# Patient Record
Sex: MALE | Race: WHITE | Employment: FULL TIME | URBAN - METROPOLITAN AREA
[De-identification: names, ages, dates, MRNs, and addresses within clinical notes are randomized per-mention and may not be internally consistent; named-entity substitution may affect disease eponyms.]

---

## 2021-03-20 ENCOUNTER — HOSPITAL ENCOUNTER (EMERGENCY)
Age: 56
Discharge: HOME | End: 2021-03-20
Attending: EMERGENCY MEDICINE
Payer: COMMERCIAL

## 2021-03-20 ENCOUNTER — NURSE TRIAGE (OUTPATIENT)
Dept: OTHER | Facility: CLINIC | Age: 56
End: 2021-03-20

## 2021-03-20 DIAGNOSIS — R42 VERTIGO: Primary | ICD-10-CM

## 2021-03-20 PROCEDURE — 93005 ELECTROCARDIOGRAM TRACING: CPT

## 2021-03-20 PROCEDURE — 74011250636 HC RX REV CODE- 250/636: Performed by: EMERGENCY MEDICINE

## 2021-03-20 PROCEDURE — 96374 THER/PROPH/DIAG INJ IV PUSH: CPT

## 2021-03-20 PROCEDURE — 99284 EMERGENCY DEPT VISIT MOD MDM: CPT

## 2021-03-20 RX ORDER — MECLIZINE HYDROCHLORIDE 25 MG/1
25 TABLET ORAL
Qty: 20 TAB | Refills: 0 | Status: SHIPPED | OUTPATIENT
Start: 2021-03-20 | End: 2021-05-03

## 2021-03-20 RX ORDER — ONDANSETRON 4 MG/1
4 TABLET, ORALLY DISINTEGRATING ORAL
Qty: 16 TAB | Refills: 0 | Status: SHIPPED | OUTPATIENT
Start: 2021-03-20

## 2021-03-20 RX ORDER — ONDANSETRON 4 MG/1
4 TABLET, ORALLY DISINTEGRATING ORAL
Qty: 16 TAB | Refills: 0 | Status: SHIPPED | OUTPATIENT
Start: 2021-03-20 | End: 2021-03-20 | Stop reason: SDUPTHER

## 2021-03-20 RX ORDER — ONDANSETRON 2 MG/ML
4 INJECTION INTRAMUSCULAR; INTRAVENOUS
Status: COMPLETED | OUTPATIENT
Start: 2021-03-20 | End: 2021-03-20

## 2021-03-20 RX ORDER — MECLIZINE HCL 12.5 MG 12.5 MG/1
25 TABLET ORAL
Status: COMPLETED | OUTPATIENT
Start: 2021-03-20 | End: 2021-03-20

## 2021-03-20 RX ORDER — MECLIZINE HYDROCHLORIDE 25 MG/1
25 TABLET ORAL
Qty: 20 TAB | Refills: 0 | Status: SHIPPED | OUTPATIENT
Start: 2021-03-20 | End: 2021-03-20 | Stop reason: SDUPTHER

## 2021-03-20 RX ADMIN — MECLIZINE 25 MG: 12.5 TABLET ORAL at 19:38

## 2021-03-20 RX ADMIN — ONDANSETRON 4 MG: 2 INJECTION INTRAMUSCULAR; INTRAVENOUS at 18:34

## 2021-03-20 RX ADMIN — MECLIZINE 25 MG: 12.5 TABLET ORAL at 18:34

## 2021-03-20 RX ADMIN — SODIUM CHLORIDE 1000 ML: 9 INJECTION, SOLUTION INTRAVENOUS at 18:35

## 2021-03-20 NOTE — ED TRIAGE NOTES
Patient presents to the emergency department reporting dizziness. Patient's wife reports patient started having a headache and dizziness on Thursday night. Patient felt fine Friday. Patient reports headache and dizziness with vomiting today. Patient vomited en route to room for triage. Patient reports dizziness is not present when he closes his eyes.

## 2021-03-20 NOTE — ED PROVIDER NOTES
Date: 3/20/2021  Patient Name: Justin Raines    History of Presenting Illness     Chief Complaint   Patient presents with    Dizziness       History Provided By: Patient    HPI: Justin Raines, 64 y.o. male with a past medical history of hyperlipidemia and depression presents to the ED with cc of dizziness that began 2 days ago. Patient states that he initially started having a sensation of the room spinning around 5 PM 2 days ago. He laid down and went to sleep and felt better yesterday but then today it started again. He denies any prior history of any similar symptoms. He has been having nausea and vomiting associated with it. He denies any headache or vision changes, but he states that anytime he opens his eyes or turns his head that is when things start spinning again. He denies any changes in speech, gait changes, facial droop, numbness or tingling or weakness in his extremities. He denies any recent illnesses. There are no other complaints, changes, or physical findings at this time. PCP: Helga Anthony MD    No current facility-administered medications on file prior to encounter. Current Outpatient Medications on File Prior to Encounter   Medication Sig Dispense Refill    atorvastatin (LIPITOR) 20 mg tablet Take 20 mg by mouth daily.  escitalopram (LEXAPRO) 10 mg tablet Take 10 mg by mouth daily.  diazepam (VALIUM) 5 mg tablet Take 1 Tab by mouth every eight (8) hours as needed (spasms) for 20 doses. 20 Tab 0    HYDROcodone-acetaminophen (NORCO) 5-325 mg per tablet Take 1 Tab by mouth every four (4) hours as needed for Pain. 20 Tab 0    ibuprofen (MOTRIN) 600 mg tablet Take 1 Tab by mouth every six (6) hours as needed for Pain.  20 Tab 0       Past History     Past Medical History:  Past Medical History:   Diagnosis Date    High cholesterol     Psychiatric disorder     depression       Past Surgical History:  Past Surgical History:   Procedure Laterality Date    HX ORTHOPAEDIC      right shoulder    NE ABDOMEN SURGERY PROC UNLISTED      hernia       Family History:  No family history on file. Social History:  Social History     Tobacco Use    Smoking status: Former Smoker    Smokeless tobacco: Never Used   Substance Use Topics    Alcohol use: No    Drug use: No       Allergies:  No Known Allergies      Review of Systems   Review of Systems   Constitutional: Negative for chills and fever. HENT: Negative. Eyes: Negative for visual disturbance. Respiratory: Negative for shortness of breath and wheezing. Cardiovascular: Negative for chest pain. Gastrointestinal: Positive for nausea and vomiting. Negative for abdominal pain, constipation and diarrhea. Endocrine: Negative. Genitourinary: Negative. Musculoskeletal: Negative. Skin: Negative. Neurological: Positive for dizziness. Negative for facial asymmetry, speech difficulty, weakness, light-headedness, numbness and headaches. Psychiatric/Behavioral: Negative. Physical Exam   Physical Exam  Vitals signs and nursing note reviewed. Constitutional:       General: He is not in acute distress. Appearance: He is well-developed. HENT:      Head: Normocephalic and atraumatic. Eyes:      Extraocular Movements: Extraocular movements intact. Conjunctiva/sclera: Conjunctivae normal.      Pupils: Pupils are equal, round, and reactive to light. Comments: R unilateral nystagmus on lateral gaze   Neck:      Musculoskeletal: Neck supple. Vascular: No JVD. Trachea: No tracheal deviation. Cardiovascular:      Rate and Rhythm: Normal rate and regular rhythm. Heart sounds: No murmur. No friction rub. No gallop. Pulmonary:      Effort: Pulmonary effort is normal. No respiratory distress. Breath sounds: Normal breath sounds. No stridor. No wheezing. Abdominal:      General: Bowel sounds are normal. There is no distension. Palpations: Abdomen is soft.  There is no mass. Tenderness: There is no abdominal tenderness. There is no guarding. Musculoskeletal: Normal range of motion. General: No tenderness. Comments: No deformity   Skin:     General: Skin is warm and dry. Findings: No rash. Neurological:      General: No focal deficit present. Mental Status: He is alert and oriented to person, place, and time. Cranial Nerves: No cranial nerve deficit. Sensory: No sensory deficit. Motor: No weakness. Coordination: Coordination normal.      Comments: No focal deficits   Psychiatric:         Behavior: Behavior normal.         Thought Content: Thought content normal.         Judgment: Judgment normal.         Diagnostic Study Results     Labs -  Recent Results (from the past 72 hour(s))   EKG, 12 LEAD, INITIAL    Collection Time: 03/20/21  6:11 PM   Result Value Ref Range    Ventricular Rate 96 BPM    Atrial Rate 96 BPM    P-R Interval 150 ms    QRS Duration 90 ms    Q-T Interval 358 ms    QTC Calculation (Bezet) 452 ms    Calculated P Axis 40 degrees    Calculated R Axis 2 degrees    Calculated T Axis 17 degrees    Diagnosis       Normal sinus rhythm  Normal ECG  No previous ECGs available         Radiologic Studies -   No orders to display     CT Results  (Last 48 hours)    None        CXR Results  (Last 48 hours)    None          Medical Decision Making   I am the first provider for this patient. I reviewed the vital signs, available nursing notes, past medical history, past surgical history, family history and social history. Vital Signs-Reviewed the patient's vital signs. Patient Vitals for the past 12 hrs:   Temp Pulse Resp BP SpO2   03/20/21 1806 98.4 °F (36.9 °C) 100 15 (!) 149/91 97 %         Records Reviewed: Nursing Notes and Old Medical Records    Provider Notes (Medical Decision Making):   Pt presenting with positional vertigo, n/v. Pt has a normal neuro exam. Will treat symptomatically then reassess.      ED Course:   Initial assessment performed. The patients presenting problems have been discussed, and they are in agreement with the care plan formulated and outlined with them. I have encouraged them to ask questions as they arise throughout their visit. EKG interpreted by me. Shows a normal sinus rhythm with a heart rate of 96. No ST elevations or depressions concerning for ischemia. Normal intervals. 7:19 PM  Pt states he is feeling much better, but he is not quite ready to stand up and walk yet. Nausea has resolved. 7:19 PM  Change of shift. Care of patient signed over to Dr. Oziel Magana. Bedside handoff complete. Awaiting another dose of meclizine and ambulation. Diagnosis     Clinical Impression:   1. Vertigo        Attestations:    Glenny Funez, DO    Please note that this dictation was completed with LensAR, the computer voice recognition software. Quite often unanticipated grammatical, syntax, homophones, and other interpretive errors are inadvertently transcribed by the computer software. Please disregard these errors. Please excuse any errors that have escaped final proofreading. Thank you.

## 2021-03-20 NOTE — TELEPHONE ENCOUNTER
Reason for Disposition   [1] Dizziness (vertigo) present now AND [2] one or more stroke risk factors (i.e., hypertension, diabetes, prior stroke/TIA, heart attack)  (Exception: prior physician evaluation for this AND no different/worse than usual)    Answer Assessment - Initial Assessment Questions  1. DESCRIPTION: \"Describe your dizziness. \"      Pt states that he feels like he has a hangover, the room is spinning     2. VERTIGO: \"Do you feel like either you or the room is spinning or tilting? \"       Yes     3. LIGHTHEADED: \"Do you feel lightheaded? \" (e.g., somewhat faint, woozy, weak upon standing)      Yes     4. SEVERITY: \"How bad is it? \"  \"Can you walk? \"    - MILD - Feels unsteady but walking normally. - MODERATE - Feels very unsteady when walking, but not falling; interferes with normal activities (e.g., school, work) . - SEVERE - Unable to walk without falling (requires assistance). Moderate     5. ONSET:  \"When did the dizziness begin? \"      2 days ago    6. AGGRAVATING FACTORS: \"Does anything make it worse? \" (e.g., standing, change in head position)      Change in head position    7. CAUSE: \"What do you think is causing the dizziness? \"      Vertigo     8. RECURRENT SYMPTOM: \"Have you had dizziness before? \" If so, ask: \"When was the last time? \" \"What happened that time? \"      Denies     9. OTHER SYMPTOMS: \"Do you have any other symptoms? \" (e.g., headache, weakness, numbness, vomiting, earache)      Nausea with 2 episodes of vomiting today    10. PREGNANCY: \"Is there any chance you are pregnant? \" \"When was your last menstrual period? \"        N/a    Protocols used: DIZZINESS - VERTIGO-ADULT-    Brief description of triage:   Pt is calling with c/o dizziness for the last 2 days. Triage indicates for patient to go to the ED. Care advice provided, patient verbalizes understanding; denies any other questions or concerns; instructed to call back for any new or worsening symptoms.     Attention Provider: Thank you for allowing me to participate in the care of your patient. The patient was connected to triage in response to symptoms provided. Please do not respond through this encounter as the response is not directed to a shared pool.

## 2021-03-21 VITALS
HEART RATE: 100 BPM | HEIGHT: 69 IN | BODY MASS INDEX: 29.71 KG/M2 | OXYGEN SATURATION: 96 % | SYSTOLIC BLOOD PRESSURE: 118 MMHG | RESPIRATION RATE: 15 BRPM | TEMPERATURE: 98.4 F | WEIGHT: 200.62 LBS | DIASTOLIC BLOOD PRESSURE: 87 MMHG

## 2021-03-21 LAB
ATRIAL RATE: 96 BPM
CALCULATED P AXIS, ECG09: 40 DEGREES
CALCULATED R AXIS, ECG10: 2 DEGREES
CALCULATED T AXIS, ECG11: 17 DEGREES
DIAGNOSIS, 93000: NORMAL
P-R INTERVAL, ECG05: 150 MS
Q-T INTERVAL, ECG07: 358 MS
QRS DURATION, ECG06: 90 MS
QTC CALCULATION (BEZET), ECG08: 452 MS
VENTRICULAR RATE, ECG03: 96 BPM

## 2021-03-22 ENCOUNTER — PATIENT OUTREACH (OUTPATIENT)
Dept: OTHER | Age: 56
End: 2021-03-22

## 2021-03-22 NOTE — PROGRESS NOTES
[facility] progress note    Patient eligible for [facility] 994 care management  Discussed the care management program.  Patient agrees to care management services at this time. PMH:   Past Medical History:   Diagnosis Date    High cholesterol     Psychiatric disorder     depression       Social History:   Social History     Socioeconomic History    Marital status:      Spouse name: Not on file    Number of children: Not on file    Years of education: Not on file    Highest education level: Not on file   Occupational History    Not on file   Social Needs    Financial resource strain: Not on file    Food insecurity     Worry: Not on file     Inability: Not on file    Transportation needs     Medical: Not on file     Non-medical: Not on file   Tobacco Use    Smoking status: Former Smoker    Smokeless tobacco: Never Used   Substance and Sexual Activity    Alcohol use: No    Drug use: No    Sexual activity: Not on file   Lifestyle    Physical activity     Days per week: Not on file     Minutes per session: Not on file    Stress: Not on file   Relationships    Social connections     Talks on phone: Not on file     Gets together: Not on file     Attends [employer] service: Not on file     Active member of club or organization: Not on file     Attends meetings of clubs or organizations: Not on file     Relationship status: Not on file    Intimate partner violence     Fear of current or ex partner: Not on file     Emotionally abused: Not on file     Physically abused: Not on file     Forced sexual activity: Not on file   Other Topics Concern    Not on file   Social History Narrative    Not on file         Patient's primary care provider relationship reviewed with patient and modified, as applicable. Care management assessment completed:  Patient continues to have symptoms of Vertigo,however they have lessened. The Meclizine ordered has been very helpful to patient.  He is able to be at work today, however looking at his computer screen has been challenging, causing increased symptoms of the Vertigo. Patient is hopeful that the medication will continue to lessen the symptoms with time. Patient very pleased with his hospital service. Medications:  New Medications at Discharge: Meclizine, Zofran  Changed Medications at Discharge: None  Discontinued Medications at Discharge: None  Current Outpatient Medications   Medication Sig    meclizine (ANTIVERT) 25 mg tablet Take 1 Tab by mouth three (3) times daily as needed for Dizziness.  ondansetron (Zofran ODT) 4 mg disintegrating tablet Take 1 Tab by mouth every eight (8) hours as needed for Nausea.  atorvastatin (LIPITOR) 20 mg tablet Take 20 mg by mouth daily.  escitalopram (LEXAPRO) 10 mg tablet Take 10 mg by mouth daily.  diazepam (VALIUM) 5 mg tablet Take 1 Tab by mouth every eight (8) hours as needed (spasms) for 20 doses.  HYDROcodone-acetaminophen (NORCO) 5-325 mg per tablet Take 1 Tab by mouth every four (4) hours as needed for Pain.  ibuprofen (MOTRIN) 600 mg tablet Take 1 Tab by mouth every six (6) hours as needed for Pain. No current facility-administered medications for this visit. There are no discontinued medications. Performed medication reconciliation with patient, and patient verbalizes understanding of administration of home medications. Patient was given approx. 20 Meclazine tablets to last until he sees the ENT. He is concerned that he will not have enough to get to the appointment, as he had to use 4 on the first day prescribed, due to the extreme vertigo symptoms. Encouraged the patient to reach out to his PCP for missing doses, rather than return to the ED.      Preventive Care     Health Maintenance   Topic Date Due    Hepatitis C Screening  Never done    Lipid Screen  Never done    DTaP/Tdap/Td series (1 - Tdap) Never done    Shingrix Vaccine Age 50> (1 of 2) Never done   Brandi Colorectal Cancer Screening Combo  Never done    Flu Vaccine (1) Never done    Pneumococcal 0-64 years  Aged Out           Goals   Goals      Attends follow up appointments on schedule      Will follow up with Dr. Isaac Medeiros, ENT on 3/24.  Knowledge and adherence of prescribed medication (ie. action, side effects, missed dose, etc.). Barriers/Support system:  patient and spouse      Barriers/Challenges to Care: []  Decline in memory    []  Language barrier     []  Emotional                  []  Limited mobility  []  Lack of motivation     [] Vision, hearing or cognitive impairment [x]  Knowledge [] Financial Barriers []  Lack of support  []  Pain [x]  Other     PCP/Specialist follow up:   No future appointments. Reviewed red flags with patient, and patient verbalizes understanding. Patient given an opportunity to ask questions. No other clinical/social/functional needs noted. The patient agrees to contact the PCP office for questions related to their healthcare. The patient expressed thanks, offered no additional questions and ended the call. Plan for next call:  Review symptoms and medication management.

## 2021-04-13 ENCOUNTER — PATIENT OUTREACH (OUTPATIENT)
Dept: OTHER | Age: 56
End: 2021-04-13

## 2021-04-28 ENCOUNTER — PATIENT OUTREACH (OUTPATIENT)
Dept: OTHER | Age: 56
End: 2021-04-28

## 2021-04-28 NOTE — PROGRESS NOTES
Incoming return call from patient. Patient reports that he was feeling much better. He has a follow up with a physician at [facility] on 5/3, (he could not recall the name, as his wife made the phone call and scheduled the appointment). Reports that the symptoms have improved and he is back at work. Plan to follow up with patient in 1-2 weeks, post new physician appointment.

## 2021-05-03 ENCOUNTER — OFFICE VISIT (OUTPATIENT)
Dept: NEUROLOGY | Age: 56
End: 2021-05-03
Payer: COMMERCIAL

## 2021-05-03 VITALS
HEART RATE: 90 BPM | DIASTOLIC BLOOD PRESSURE: 80 MMHG | RESPIRATION RATE: 18 BRPM | SYSTOLIC BLOOD PRESSURE: 120 MMHG | OXYGEN SATURATION: 98 %

## 2021-05-03 DIAGNOSIS — G43.109 MIGRAINE WITH VERTIGO: Primary | ICD-10-CM

## 2021-05-03 PROCEDURE — 99203 OFFICE O/P NEW LOW 30 MIN: CPT | Performed by: PSYCHIATRY & NEUROLOGY

## 2021-05-03 RX ORDER — MONTELUKAST SODIUM 10 MG/1
10 TABLET ORAL DAILY
COMMUNITY

## 2021-05-03 RX ORDER — FENOFIBRATE 160 MG/1
160 TABLET ORAL DAILY
COMMUNITY

## 2021-05-03 RX ORDER — LISINOPRIL 10 MG/1
TABLET ORAL DAILY
COMMUNITY

## 2021-05-03 NOTE — PROGRESS NOTES
[facility] PATIENT EVALUATION/CONSULTATION       PATIENT NAME: Clare Toro    MRN: [id number]    REASON FOR CONSULTATION: Vertiginous attacks in the setting of chronic headache    05/03/21    HISTORY OF PRESENT ILLNESS:  Clare Toro is a 64 y.o. right-hand-dominant male who presents to [facility] neurology clinic for evaluation of intermittent attacks of vertigo. Patient states that he has history of chronic headaches for many years along with a father who suffered from them and his own son. Headaches are described as being bifrontal in nature and occur daily. Headaches occur in the setting of untreated sleep apnea as he has tried two sleep mask without success. He also endorses decreased activity over the years as well as excessive caffeine use and ibuprofen use or averaging twice per week. In the setting of these chronic headaches, he has had several episodes of severe vertigo the first occurring near his birthday when he was sitting at the table and became vertiginous out of the blue. Went and sat at the couch did not feel better and then went to sleep with resolution of symptoms. He then had a second episode in March where he had severe onset of vertigo coupled with imbalance which brought him to the ER. While there, he received fluids and medications felt somewhat better. Noncontrasted CT was reassuring. Following that visit to the ER, he went saw an ENT who did a variety of tests including VNG which demonstrated some vertical nystagmus (?). He was then referred to a neurologist as they did not identify cause. States he has had maybe a third episode in between the first 2 mentioned above though that was milder in intensity. Denies this is being a problem before denies any tinnitus or hearing loss with the episodes and any clear provocative factors.       PAST MEDICAL HISTORY:  Past Medical History:   Diagnosis Date    High cholesterol     Psychiatric disorder     depression       PAST SURGICAL HISTORY:  Past Surgical History:   Procedure Laterality Date    HX ORTHOPAEDIC      right shoulder    [place] ABDOMEN SURGERY PROC UNLISTED      hernia       FAMILY HISTORY:   No family history on file. SOCIAL HISTORY:  Social History     Socioeconomic History    Marital status:      Spouse name: Not on file    Number of children: Not on file    Years of education: Not on file    Highest education level: Not on file   Tobacco Use    Smoking status: Former Smoker    Smokeless tobacco: Never Used   Substance and Sexual Activity    Alcohol use: No    Drug use: No         MEDICATIONS:   Current Outpatient Medications   Medication Sig Dispense Refill    fenofibrate (LOFIBRA) 160 mg tablet Take 160 mg by mouth daily.  montelukast (SINGULAIR) 10 mg tablet Take 10 mg by mouth daily.  lisinopriL (PRINIVIL, ZESTRIL) 10 mg tablet Take  by mouth daily.  atorvastatin (LIPITOR) 20 mg tablet Take 20 mg by mouth daily.  ondansetron (Zofran ODT) 4 mg disintegrating tablet Take 1 Tab by mouth every eight (8) hours as needed for Nausea. 16 Tab 0    ibuprofen (MOTRIN) 600 mg tablet Take 1 Tab by mouth every six (6) hours as needed for Pain. 20 Tab 0         ALLERGIES:  No Known Allergies      REVIEW OF SYSTEMS:  10 point ROS reviewed with patient. Please see scanned document under media. PHYSICAL EXAM:  Vital Signs:   Visit Vitals  /80   Pulse 90   Resp 18   SpO2 98%     Physical exam  Pleasant male appearing older than stated age sitting comfortably in bed appearing rushed. HEENT appears grossly intact lower face not examined. Neck is supple. Cardiovascular demonstrates constant S1/S2. Pulmonary demonstrates a culture bilaterally. Extremities are warm/dry. Neurologically patient appears alert and oriented attention intact. Speech is clear, language fluent.   Cranials 2-12 appear grossly intact, there is 1-2 beats of nonsustained rightward nystagmus noted. Motorically patient has normal bulk and tone 5 out of 5 strength in upper and lower extremities. Sensation appears grossly intact. Coordination is intact in upper extremities. Primary gait and station appear intact. PERTINENT DATA:    CT Results (maximum last 3): Results from [place] encounter on 12/01/12   CT CHEST ABD PELV W CONT    Narrative **Final Report**       ICD Codes / Adm. Diagnosis: 580652   / Fall    Examination:  CT CHEST ABD PELVIS Chrystal Herzog  - [id number] - Dec  1 2012  6:51PM  Accession No:  [id number]  Reason:  20 ft fall out of a treestand with LOC/neck pain/right rib   pain/epigastric pain/      REPORT:  INDICATION: 20 foot fall, pain. Exam: CT of the chest, abdomen and pelvis is performed with 5 mm   collimation. The study is performed with p.o. and a total of 193 mL of   nonionic IV Optiray-350. Noncontrast images were not performed. There is no prior study for direct comparison. Findings:    Chest: The lungs are clear bilaterally. There is no axillary, mediastinal or   hilar lymphadenopathy. There is no mediastinal hematoma; the thoracic aorta   is normal in appearance, however it should be noted that a dedicated CT   arteriogram was not performed. Pleural spaces are normal. Heart is of   normal size and there is no pericardial effusion. Abdomen: The liver, spleen, adrenals, kidneys, pancreas and gallbladder are   normal. There is no mesenteric or retroperitoneal lymphadenopathy. No   thickened or dilated loop of large or small bowel is visualized. The   appendix is normal. There is no free intraperitoneal gas or fluid. Pelvis: Urinary bladder is partially filled and grossly normal. There is no   pelvic lymphadenopathy. No acute fracture is visualized. No free fluid in   the pelvis. IMPRESSION: No evidence of traumatic injury within the chest, abdomen or   pelvis. Signing/Reading Doctor: PORFIRIO Engel ([id number]) Approved: PORFIRIO Kang Jolynn ([id number])  Dec  1 2012  7:26PM                                  CT SPINE CERV WO CONT    Narrative **Final Report**       ICD Codes / Adm. Diagnosis: 392852   / Fall    Examination:  CT C SPINE WO CON  - [id number] - Dec  1 2012  6:32PM  Accession No:  [id number]  Reason:  20 ft fall out of a treestand with LOC/neck pain/right rib   pain/epigastric pain/      REPORT:  CLINICAL HISTORY: Fall, pain    Multiple axial images were obtained from the skull base to T1 followed by   sagittal and coronal reconstructed images. Examination is negative for acute   fracture, subluxation, or prevertebral edema. Normal alignment is maintained   to T1. Spondylosis is seen at C3-4, C5-6, and C6-7. Posterior   osteophyte/disc bulge complex is largest at C5-6 level with mild stenosis. Moderate mucoperiosteal thickening is seen in the right sphenoid sinus. IMPRESSION: Multilevel spondylosis without evidence of acute fracture. Signing/Reading Doctor: Katrina Hallman ([id number])    Approved: Katrina Hallman ([id number])  Dec  1 2012  7:24PM                               CT HEAD WO CONT    Narrative **Final Report**       ICD Codes / Adm. Diagnosis: [id number]   / Fall    Examination:  CT HEAD WO CON  - [id number] - Dec  1 2012  6:28PM  Accession No:  [id number]  Reason:  20 ft fall out of a treestand with LOC/neck pain/right rib   pain/epigastric pain/      REPORT:  INDICATION: 20 ft fall out of a treestand with LOC/neck pain/right rib   pain/epigastric pain/    Exam: Noncontrast CT of the brain is performed with 5 mm collimation. FINDINGS: There is no acute intracranial hemorrhage, mass, mass effect or   herniation. Ventricular system is normal. The gray-white matter   differentiation is well-preserved. The mastoid air cells are well   pneumatized. The visualized paranasal sinuses are normal.        IMPRESSION: No acute intracranial hemorrhage, mass or infarct.               Signing/Reading Doctor: PORFIRIO OCTAVIANO Thorne ([id number])    Approved: PORFIRIO Thorne ([id number])  Dec  1 2012  6:35PM                                    ASSESSMENT:      Yuriy Rebolledo is a 70-year-old right-hand-dominant male history of vascular risk factors who presents to [facility] neurology clinic for evaluation of intermittent episodes of vertiginous attacks along with chronic headache    PLAN:  Central vertigo:  Suspect secondary to migrainous phenomenon, patient defers on migraine prophylaxis for now  We will obtain MRI brain to further evaluate  Patient deferred on headache medication, discussed headache hygiene including decreasing caffeine intake, decreasing ibuprofen use, considering addressing untreated sleep apnea and increasing exercise  We will contact patient with results of MRI and follow-up as needed    Dalia Urena MD       CC Referring provider:  Yvonne Roche MD

## 2021-05-03 NOTE — PATIENT INSTRUCTIONS
[facility]   Statement to Patients  April 1, 2014      In an effort to ensure the large volume of patient prescription refills is processed in the most efficient and expeditious manner, we are asking our patients to assist us by calling your Pharmacy for all prescription refills, this will include also your  Mail Order Pharmacy. The pharmacy will contact our office electronically to continue the refill process. Please do not wait until the last minute to call your pharmacy. We need at least 48 hours (2days) to fill prescriptions. We also encourage you to call your pharmacy before going to  your prescription to make sure it is ready. With regard to controlled substance prescription refill requests (narcotic refills) that need to be picked up at our office, we ask your cooperation by providing us with at least 72 hours (3days) notice that you will need a refill. We will not refill narcotic prescription refill requests after 4:00pm on any weekday, Monday through Thursday, or after 2:00pm on Fridays, or on the weekends. We encourage everyone to explore another way of getting your prescription refill request processed using [email or web address], our patient web portal through our electronic medical record system. [email or web address] is an efficient and effective way to communicate your medication request directly to the office and  downloadable as an vanda on your smart phone . [email or web address] also features a review functionality that allows you to view your medication list as well as leave messages for your physician. Are you ready to get connected? If so please review the attatched instructions or speak to any of our staff to get you set up right away! Thank you so much for your cooperation. Should you have any questions please contact our Practice Administrator.     The Physicians and Staff,  [facility]

## 2021-05-12 ENCOUNTER — PATIENT OUTREACH (OUTPATIENT)
Dept: OTHER | Age: 56
End: 2021-05-12

## 2021-05-25 ENCOUNTER — PATIENT OUTREACH (OUTPATIENT)
Dept: OTHER | Age: 56
End: 2021-05-25

## 2021-05-25 NOTE — PROGRESS NOTES
Telephonic outreach to follow up and assess progress and needs. Left a discreet VM. Will close the episode if no return call, as patient has resources in place. Will await a return call, if no call, will close episode in one week.

## 2021-06-01 ENCOUNTER — PATIENT OUTREACH (OUTPATIENT)
Dept: OTHER | Age: 56
End: 2021-06-01

## 2022-01-18 ENCOUNTER — TRANSCRIBE ORDER (OUTPATIENT)
Dept: SCHEDULING | Age: 57
End: 2022-01-18

## 2022-01-18 DIAGNOSIS — R13.12 OROPHARYNGEAL DYSPHAGIA: Primary | ICD-10-CM

## 2022-01-18 DIAGNOSIS — K21.9 GERD (GASTROESOPHAGEAL REFLUX DISEASE): ICD-10-CM

## 2022-01-25 ENCOUNTER — HOSPITAL ENCOUNTER (OUTPATIENT)
Dept: GENERAL RADIOLOGY | Age: 57
Discharge: HOME | End: 2022-01-25
Attending: INTERNAL MEDICINE
Payer: COMMERCIAL

## 2022-01-25 DIAGNOSIS — R13.12 OROPHARYNGEAL DYSPHAGIA: ICD-10-CM

## 2022-01-25 DIAGNOSIS — K21.9 GERD (GASTROESOPHAGEAL REFLUX DISEASE): ICD-10-CM

## 2022-01-25 PROCEDURE — 74220 X-RAY XM ESOPHAGUS 1CNTRST: CPT

## 2023-02-28 ENCOUNTER — OFFICE VISIT (OUTPATIENT)
Dept: CARDIOLOGY CLINIC | Age: 58
End: 2023-02-28
Payer: COMMERCIAL

## 2023-02-28 VITALS
BODY MASS INDEX: 31.4 KG/M2 | RESPIRATION RATE: 16 BRPM | HEART RATE: 88 BPM | OXYGEN SATURATION: 94 % | WEIGHT: 212 LBS | SYSTOLIC BLOOD PRESSURE: 124 MMHG | DIASTOLIC BLOOD PRESSURE: 80 MMHG | HEIGHT: 69 IN

## 2023-02-28 DIAGNOSIS — Z87.891 HISTORY OF TOBACCO USE: ICD-10-CM

## 2023-02-28 DIAGNOSIS — I10 BENIGN ESSENTIAL HTN: ICD-10-CM

## 2023-02-28 DIAGNOSIS — E78.2 MIXED HYPERLIPIDEMIA: ICD-10-CM

## 2023-02-28 DIAGNOSIS — I20.0 UNSTABLE ANGINA (HCC): Primary | ICD-10-CM

## 2023-02-28 PROCEDURE — 99204 OFFICE O/P NEW MOD 45 MIN: CPT | Performed by: INTERNAL MEDICINE

## 2023-02-28 PROCEDURE — 3078F DIAST BP <80 MM HG: CPT | Performed by: INTERNAL MEDICINE

## 2023-02-28 PROCEDURE — 3074F SYST BP LT 130 MM HG: CPT | Performed by: INTERNAL MEDICINE

## 2023-02-28 PROCEDURE — 93000 ELECTROCARDIOGRAM COMPLETE: CPT | Performed by: INTERNAL MEDICINE

## 2023-02-28 RX ORDER — METHYLPREDNISOLONE 4 MG/1
TABLET ORAL
COMMUNITY

## 2023-02-28 RX ORDER — OMEPRAZOLE 10 MG/1
CAPSULE, DELAYED RELEASE ORAL
COMMUNITY
Start: 2022-01-01

## 2023-02-28 RX ORDER — FLUOXETINE 10 MG/1
CAPSULE ORAL
COMMUNITY
Start: 2019-01-01

## 2023-02-28 RX ORDER — CLOTRIMAZOLE AND BETAMETHASONE DIPROPIONATE 10; .64 MG/G; MG/G
CREAM TOPICAL
COMMUNITY
Start: 2023-01-03 | End: 2023-02-28

## 2023-02-28 NOTE — LETTER
Patient:  Gregory Ye   YOB: 1965  Date of Visit: 2/28/2023      Dear Chuy Anne MD  [address] [place] [address],[address] [place] [address]  Via Fax: [phone]: Thank you for referring Mr. Gregory Ye to me for evaluation/treatment. Below are the relevant portions of my assessment and plan of care. Mr. Aleisha Pickard is a 61 yo M with history of gastroesophageal reflux, sounds like dilation of a stricture last year, essential hypertension, mixed hyperlipidemia, family history of early coronary artery disease, referred by his primary care physician for cardiac evaluation. Over the last week and a half, he started having chest discomfort that was substernal, persistent. He does think he had this happen similarly approximately 20 years ago and cardiac workup at that time was okay. He does admit to a lot of stress from work. Overall, his breathing has been stable. He did have some swelling in his hands and was apparently seen at his extended care with his primary care physician and they placed him on some Prednisone that helped. He still is feeling chest discomfort and he does feel like at times his breathing is \"compressed\". He did have one occasion where he had some radiation to his shoulder and jaw, but this has not recurred. He is compensated on exam with clear lungs and no lower extremity edema. His EKG here is normal sinus rhythm, nonspecific ST-T wave abnormality, heart rate of 79, left axis deviation. Soc hx. 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 Dad with kidney issues, CAD/MI 62s  Assessment and Plan:   1. Unstable angina. Chest pain, shortness of breath with typical and atypical features and multiple risk factors; will proceed with a treadmill stress echocardiogram for further evaluation. If this is unrevealing, would consider GI etiology. 2. Gastroesophageal reflux disease, history of what sounds like a stricture and dilation in 2022.    3. History of tobacco use. Quit many years ago. 4. Essential hypertension. Blood pressure is controlled and no changes made. His blood pressure has been mildly elevated at home, but is normal here. 5. Mixed hyperlipidemia. Tolerating statin. If you have questions, please do not hesitate to call me.       Sincerely,      Rosemary Luke MD

## 2023-02-28 NOTE — PATIENT INSTRUCTIONS
An order has been placed for you for stress echocardiogram. Please call to schedule this through [facility] at [phone].

## 2023-02-28 NOTE — PROGRESS NOTES
[facility]: Mac Santana  [phone]    History of Present Illness:  Mr. Nora Davis is a 61 yo M with history of gastroesophageal reflux, sounds like dilation of a stricture last year, essential hypertension, mixed hyperlipidemia, family history of early coronary artery disease, referred by his primary care physician for cardiac evaluation. Over the last week and a half, he started having chest discomfort that was substernal, persistent. He does think he had this happen similarly approximately 20 years ago and cardiac workup at that time was okay. He does admit to a lot of stress from work. Overall, his breathing has been stable. He did have some swelling in his hands and was apparently seen at his extended care with his primary care physician and they placed him on some Prednisone that helped. He still is feeling chest discomfort and he does feel like at times his breathing is \"compressed\". He did have one occasion where he had some radiation to his shoulder and jaw, but this has not recurred. He is compensated on exam with clear lungs and no lower extremity edema. His EKG here is normal sinus rhythm, nonspecific ST-T wave abnormality, heart rate of 79, left axis deviation. Soc hx. 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 Dad with kidney issues, CAD/MI 62s  Assessment and Plan:   1. Unstable angina. Chest pain, shortness of breath with typical and atypical features and multiple risk factors; will proceed with a treadmill stress echocardiogram for further evaluation. If this is unrevealing, would consider GI etiology. 2. Gastroesophageal reflux disease, history of what sounds like a stricture and dilation in 2022. 3. History of tobacco use. Quit many years ago. 4. Essential hypertension. Blood pressure is controlled and no changes made. His blood pressure has been mildly elevated at home, but is normal here. 5. Mixed hyperlipidemia. Tolerating statin.           He  has a past medical history of High cholesterol and Psychiatric disorder. All other systems negative except as above. PE  Vitals:    02/28/23 1306   BP: 124/80   Pulse: 88   Resp: 16   SpO2: 94%   Weight: 212 lb (96.2 kg)   Height: 5' 9\" (1.753 m)    Body mass index is 31.31 kg/m². General appearance - alert, well appearing, and in no distress  Mental status - affect appropriate to mood  Eyes - sclera anicteric, moist mucous membranes  Neck - supple, no JVD  Chest - clear to auscultation, no wheezes, rales or rhonchi  Heart - normal rate, regular rhythm, normal S1, S2, no murmurs, rubs, clicks or gallops  Abdomen - soft, nontender, nondistended, no masses or organomegaly  Back exam - full range of motion, no tenderness  Neurological - no focal deficits  Extremities - peripheral pulses normal, no pedal edema      Recent Labs:  No results found for: CHOL, CHOLX, CHLST, CHOLV, 256456, HDL, HDLP, LDL, LDLC, DLDLP, TGLX, TRIGL, TRIGP, CHHD, CHHDX  Lab Results   Component Value Date/Time    Creatinine 1.07 12/01/2012 05:40 PM     Lab Results   Component Value Date/Time    BUN 17 12/01/2012 05:40 PM     Lab Results   Component Value Date/Time    Potassium 3.8 12/01/2012 05:40 PM     No results found for: HBA1C, OTL4JEJX, CWY0JWJG  Lab Results   Component Value Date/Time    HGB 15.0 12/01/2012 05:40 PM     Lab Results   Component Value Date/Time    PLATELET 190 06/11/7284 05:40 PM       Reviewed:  Past Medical History:   Diagnosis Date    High cholesterol     Psychiatric disorder     depression     Social History     Tobacco Use   Smoking Status Former   Smokeless Tobacco Never     Social History     Substance and Sexual Activity   Alcohol Use No     No Known Allergies    Current Outpatient Medications   Medication Sig    omeprazole (PRILOSEC) 10 mg capsule     FLUoxetine (PROzac) 10 mg capsule     methylPREDNISolone (MEDROL) 4 mg tab Take  by mouth daily (with breakfast). mecobalamin (B12 ACTIVE PO) Take  by mouth. fenofibrate (LOFIBRA) 160 mg tablet Take 160 mg by mouth daily. montelukast (SINGULAIR) 10 mg tablet Take 10 mg by mouth daily. lisinopriL (PRINIVIL, ZESTRIL) 10 mg tablet Take  by mouth daily. atorvastatin (LIPITOR) 20 mg tablet Take 20 mg by mouth daily. clotrimazole-betamethasone (LOTRISONE) topical cream     ondansetron (Zofran ODT) 4 mg disintegrating tablet Take 1 Tab by mouth every eight (8) hours as needed for Nausea. (Patient not taking: Reported on 2/28/2023)    ibuprofen (MOTRIN) 600 mg tablet Take 1 Tab by mouth every six (6) hours as needed for Pain. (Patient not taking: Reported on 2/28/2023)     No current facility-administered medications for this visit.        Eric Santacruz MD  [facility] [place]  [address] [facility] [address]  [address]

## 2023-03-28 ENCOUNTER — HOSPITAL ENCOUNTER (OUTPATIENT)
Dept: NON INVASIVE DIAGNOSTICS | Age: 58
Discharge: HOME | End: 2023-03-28
Attending: INTERNAL MEDICINE
Payer: COMMERCIAL

## 2023-03-28 VITALS — HEIGHT: 69 IN | WEIGHT: 212 LBS | BODY MASS INDEX: 31.4 KG/M2

## 2023-03-28 DIAGNOSIS — I20.0 UNSTABLE ANGINA (HCC): ICD-10-CM

## 2023-03-28 DIAGNOSIS — E78.2 MIXED HYPERLIPIDEMIA: ICD-10-CM

## 2023-03-28 DIAGNOSIS — Z87.891 HISTORY OF TOBACCO USE: ICD-10-CM

## 2023-03-28 DIAGNOSIS — I10 BENIGN ESSENTIAL HTN: ICD-10-CM

## 2023-03-28 LAB
STRESS ANGINA INDEX: 0
STRESS BASELINE DIAS BP: 94 MMHG
STRESS BASELINE HR: 58 BPM
STRESS BASELINE ST DEPRESSION: 0 MM
STRESS BASELINE SYS BP: 128 MMHG
STRESS ESTIMATED WORKLOAD: 11.7 METS
STRESS EXERCISE DUR MIN: 10 MIN
STRESS EXERCISE DUR SEC: 0 SEC
STRESS PEAK DIAS BP: 75 MMHG
STRESS PEAK SYS BP: 150 MMHG
STRESS PERCENT HR ACHIEVED: 106 %
STRESS POST PEAK HR: 171 BPM
STRESS RATE PRESSURE PRODUCT: NORMAL BPM*MMHG
STRESS STAGE 1 BP: NORMAL MMHG
STRESS STAGE 1 DURATION: 3 MIN:SEC
STRESS STAGE 1 HR: 112 BPM
STRESS STAGE 2 BP: NORMAL MMHG
STRESS STAGE 2 DURATION: 3 MIN:SEC
STRESS STAGE 2 HR: 133 BPM
STRESS STAGE 3 BP: NORMAL MMHG
STRESS STAGE 3 DURATION: 3 MIN:SEC
STRESS STAGE 3 HR: 151 BPM
STRESS STAGE 4 DURATION: 1 MIN:SEC
STRESS STAGE 4 HR: 171 BPM
STRESS STAGE RECOVERY 1 HR: 150 BPM
STRESS STAGE RECOVERY 2 BP: NORMAL MMHG
STRESS STAGE RECOVERY 2 HR: 122 BPM
STRESS STAGE RECOVERY 3 BP: NORMAL MMHG
STRESS STAGE RECOVERY 3 HR: 99 BPM
STRESS TARGET HR: 162 BPM

## 2023-03-28 PROCEDURE — 93351 STRESS TTE COMPLETE: CPT

## 2023-03-29 ENCOUNTER — PATIENT MESSAGE (OUTPATIENT)
Dept: CARDIOLOGY CLINIC | Age: 58
End: 2023-03-29

## 2023-03-29 NOTE — TELEPHONE ENCOUNTER
MD Renée Garcia, GALLO; Neisha Rubio, GALLO  Please let pt know stress test was normal. thx     [facility]

## 2023-12-06 ENCOUNTER — OFFICE VISIT (OUTPATIENT)
Age: 58
End: 2023-12-06

## 2023-12-06 VITALS
SYSTOLIC BLOOD PRESSURE: 114 MMHG | DIASTOLIC BLOOD PRESSURE: 75 MMHG | HEIGHT: 70 IN | TEMPERATURE: 100.3 F | HEART RATE: 120 BPM | WEIGHT: 208 LBS | BODY MASS INDEX: 29.78 KG/M2

## 2023-12-06 DIAGNOSIS — J02.9 SORE THROAT: ICD-10-CM

## 2023-12-06 DIAGNOSIS — J10.1 INFLUENZA A: Primary | ICD-10-CM

## 2023-12-06 LAB
INFLUENZA A ANTIGEN, POC: POSITIVE
INFLUENZA B ANTIGEN, POC: NEGATIVE
VALID INTERNAL CONTROL, POC: ABNORMAL

## 2023-12-06 RX ORDER — OSELTAMIVIR PHOSPHATE 75 MG/1
75 CAPSULE ORAL 2 TIMES DAILY
Qty: 10 CAPSULE | Refills: 0 | Status: SHIPPED | OUTPATIENT
Start: 2023-12-06 | End: 2023-12-11

## 2023-12-06 RX ORDER — BENZONATATE 200 MG/1
200 CAPSULE ORAL 3 TIMES DAILY PRN
Qty: 30 CAPSULE | Refills: 0 | Status: SHIPPED | OUTPATIENT
Start: 2023-12-06 | End: 2023-12-16

## 2024-04-17 ENCOUNTER — OFFICE VISIT (OUTPATIENT)
Age: 59
End: 2024-04-17

## 2024-04-17 VITALS
WEIGHT: 204.2 LBS | SYSTOLIC BLOOD PRESSURE: 125 MMHG | OXYGEN SATURATION: 97 % | DIASTOLIC BLOOD PRESSURE: 85 MMHG | HEIGHT: 69 IN | RESPIRATION RATE: 18 BRPM | HEART RATE: 72 BPM | TEMPERATURE: 98.1 F | BODY MASS INDEX: 30.24 KG/M2

## 2024-04-17 DIAGNOSIS — J02.9 SORE THROAT: ICD-10-CM

## 2024-04-17 DIAGNOSIS — J06.9 VIRAL UPPER RESPIRATORY TRACT INFECTION: Primary | ICD-10-CM

## 2024-04-17 DIAGNOSIS — R05.9 COUGH, UNSPECIFIED TYPE: ICD-10-CM

## 2024-04-17 LAB
GROUP A STREP ANTIGEN, POC: NEGATIVE
VALID INTERNAL CONTROL, POC: NORMAL

## 2024-04-17 RX ORDER — DEXTROMETHORPHAN HYDROBROMIDE AND PROMETHAZINE HYDROCHLORIDE 15; 6.25 MG/5ML; MG/5ML
5 SYRUP ORAL 4 TIMES DAILY PRN
Qty: 118 ML | Refills: 0 | Status: SHIPPED | OUTPATIENT
Start: 2024-04-17 | End: 2024-04-24

## 2024-04-17 RX ORDER — BENZONATATE 200 MG/1
200 CAPSULE ORAL 3 TIMES DAILY PRN
Qty: 30 CAPSULE | Refills: 0 | Status: SHIPPED | OUTPATIENT
Start: 2024-04-17 | End: 2024-04-27

## 2024-04-17 ASSESSMENT — ENCOUNTER SYMPTOMS
STRIDOR: 0
VOMITING: 0
WHEEZING: 0
SINUS PAIN: 0
COUGH: 1
NAUSEA: 0
SINUS PRESSURE: 0
RHINORRHEA: 0
SORE THROAT: 1
TROUBLE SWALLOWING: 0
DIARRHEA: 0
ABDOMINAL PAIN: 0
CONSTIPATION: 0
ABDOMINAL DISTENTION: 0
VOICE CHANGE: 0
SHORTNESS OF BREATH: 0

## 2024-04-17 NOTE — PATIENT INSTRUCTIONS
Results for orders placed or performed in visit on 04/17/24   AMB POC RAPID STREP A   Result Value Ref Range    Valid Internal Control, POC pass     Group A Strep Antigen, POC Negative         You have been diagnosed with an Upper respiratory viral infection. It is important to monitor your fever and take Tylenol and/or ibuprofen to keep your fever down and reduce body aches. For nasal congestion, Flonase nasal spray may be used for nasal stuffiness. To dry up nasal secretions you may use Sudafed, if you do not have high blood pressure. For people with hypertension, use Coricidin HBP. Use Mucinex (guaifenesin) to thin thick secretions.     Prescribed Tessalon perles for cough and Promethazine with Dextromethorphan for night time cough ( this has CNS effects, can make you feel drowsy, don't operate machinery while taking).     It is also important to maintain good hydration, drink at least 64 ounces of fluid, water, juice, gingerale and gatorade are good choices. Avoid drinks with caffeine as they do not hydrate. Monitor for good urine output.     If fever persists, you develop abdominal pain, vomiting or shortness of breath, or do not  improve, please call PCP or go to nearest ED.     Your fever usually resolves in 48 to 72 hours. Other symptoms, such as cough and nasal stuffiness usually resolve in 7 to 10 days, but may last longer.    Thank you for coming in to the [facility] today. I hope you are feeling better soon!

## 2024-04-17 NOTE — PROGRESS NOTES
Amadou Torres (:  1965) is a 59 y.o. male,Established patient, here for evaluation of the following chief complaint(s):  Congestion and Cough (Patient has chest congestion with cough that started Friday.)        Assessment    1. Viral upper respiratory tract infection  2. Cough, unspecified type  -     AMB POC RAPID STREP A  The following orders have not been finalized:  -     promethazine-dextromethorphan (PROMETHAZINE-DM) 6.25-15 MG/5ML syrup  -     benzonatate (TESSALON) 200 MG capsule     Plan    Patient had erythema in the throat, particularly the left tonsillar pillar however strep testing was negative.  I discussed with the patient his symptoms most likely represents an upper respiratory infection, the common cold. I have given the patient prescriptions for the cough that is beginning to interfere with his sleep.  I prescribed Tessalon Perles as well as cough syrup, promethazine with dextromethorphan.    Findings consistent with a viral illness, with symptoms for past 5 days. Covid and influenza testing were  not performed as symptoms have exceeded 5 days. Strep testing was negative. There is no evidence of purulent sinus discharge, adventitious breath sounds or other findings to suggest a bacterial component. Recommendation to treat fever, with appropriate antipyretics along with adequate oral hydration, and rest were reviewed. Patient should monitor symptoms, if development of lethargy or abdominal pain present he should seek re-evaluation. Otherwise, I reviewed acute symptoms, especially fever, usually begin to resolve in 72 hours, though milder symptoms, particularly runny nose, cough, fatigue may last 7 to 10 days.    I have discussed the results of my assessment, diagnosis and treatment plan with the patient. The patient also understands that early in the process of an illness, an Urgent Care work-up can be falsely reassuring. Therefore, if symptoms change, worsen or do not resolve and

## 2024-04-27 ENCOUNTER — OFFICE VISIT (OUTPATIENT)
Age: 59
End: 2024-04-27

## 2024-04-27 VITALS
DIASTOLIC BLOOD PRESSURE: 76 MMHG | SYSTOLIC BLOOD PRESSURE: 107 MMHG | HEIGHT: 69 IN | RESPIRATION RATE: 17 BRPM | HEART RATE: 85 BPM | BODY MASS INDEX: 30.21 KG/M2 | OXYGEN SATURATION: 97 % | WEIGHT: 204 LBS | TEMPERATURE: 98.5 F

## 2024-04-27 DIAGNOSIS — J01.00 ACUTE NON-RECURRENT MAXILLARY SINUSITIS: Primary | ICD-10-CM

## 2024-04-27 DIAGNOSIS — R05.9 COUGH IN ADULT: ICD-10-CM

## 2024-04-27 RX ORDER — CODEINE PHOSPHATE/GUAIFENESIN 10-100MG/5
5 LIQUID (ML) ORAL 2 TIMES DAILY PRN
Qty: 50 ML | Refills: 0 | Status: SHIPPED | OUTPATIENT
Start: 2024-04-27 | End: 2024-05-02

## 2024-04-27 RX ORDER — AZITHROMYCIN 250 MG/1
TABLET, FILM COATED ORAL
Qty: 6 TABLET | Refills: 0 | Status: SHIPPED | OUTPATIENT
Start: 2024-04-27 | End: 2024-05-07

## 2024-04-27 ASSESSMENT — ENCOUNTER SYMPTOMS
SHORTNESS OF BREATH: 0
SINUS PRESSURE: 1
SORE THROAT: 0
VOICE CHANGE: 0
TROUBLE SWALLOWING: 0
COUGH: 0
NAUSEA: 0
DIARRHEA: 0
VOMITING: 0
RHINORRHEA: 1
CONSTIPATION: 0
ABDOMINAL PAIN: 0
SINUS PAIN: 1

## 2024-04-27 NOTE — PROGRESS NOTES
Amadou Torres (:  1965) is a 59 y.o. male,Established patient, here for evaluation of the following chief complaint(s):  Cough (Pt presents for cough and fatigue x 2 weeks. Pt states he was seen 10 days ago and states he has not gotten better and would like to be put on antibiotics. )        Assessment    1. Acute non-recurrent maxillary sinusitis  -     azithromycin (ZITHROMAX) 250 MG tablet; 500mg on day 1 followed by 250mg on days 2 - 5, Disp-6 tablet, R-0Normal  2. Cough in adult  -     guaiFENesin-codeine (TUSSI-ORGANIDIN NR) 100-10 MG/5ML syrup; Take 5 mLs by mouth 2 times daily as needed for Cough for up to 5 days. Max Daily Amount: 10 mLs, Disp-50 mL, R-0Normal     Plan    Patient's findings most consistent with sinusitis.  He also has continued chronic cough, may be secondary to postnasal drip or resolving viral illness.  Either way, the previous cough syrup promethazine and guaifenesin did not give him relief. Will provide  very small amount of cough syrup with codeine.  As he states he always seems to respond to Z-pack, I have written for Azithromycin, which would cover respiratory infection. If he does not respond, Augmentin would be second choice. His lungs are clear, I do not feel a chest xray is needed as breath sounds are very clear.  Patient should return if he has resurgence of his fever, new symptoms or failure to improve.     I have discussed the results of my assessment, diagnosis and treatment plan with the patient. The patient also understands that early in the process of an illness, an Urgent Care work-up can be falsely reassuring. Therefore, if symptoms change, worsen or do not resolve and remain persistent, they should return to Urgent Care or seek further evaluation in the ED for immediate assessment. Additionally, they should continue care with their Primary provider. No further questions remained and patient was discharged to home.    Subjective    HPI  59-year-old gentleman

## 2024-04-27 NOTE — PATIENT INSTRUCTIONS
You have been diagnosed with an Upper respiratory viral infection as well as sinusitis. It is important to monitor your fever and take Tylenol and/or ibuprofen to keep your fever down and reduce body aches. For nasal congestion, Flonase nasal spray may be used for nasal stuffiness. To dry up nasal secretions you may use Sudafed, if you do not have high blood pressure. For people with hypertension, use Coricidin HBP.    It is also important to maintain good hydration, drink at least 64 ounces of fluid, water, juice, gingerale and gatorade are good choices. Avoid drinks with caffeine as they do not hydrate. Monitor for good urine output.     If fever persists, you develop abdominal pain, vomiting or shortness of breath, or do not  improve, please call PCP or go to nearest ED.     Your fever usually resolves in 48 to 72 hours. Other symptoms, such as cough and nasal stuffiness usually resolve in 7 to 10 days, but may last longer.    Thank you for coming in to the [facility] today. I hope you are feeling better soon!

## 2024-09-16 ENCOUNTER — OFFICE VISIT (OUTPATIENT)
Age: 59
End: 2024-09-16
Payer: COMMERCIAL

## 2024-09-16 VITALS
OXYGEN SATURATION: 99 % | HEART RATE: 70 BPM | HEIGHT: 69 IN | BODY MASS INDEX: 31.4 KG/M2 | DIASTOLIC BLOOD PRESSURE: 80 MMHG | SYSTOLIC BLOOD PRESSURE: 134 MMHG | WEIGHT: 212 LBS

## 2024-09-16 DIAGNOSIS — I10 BENIGN ESSENTIAL HTN: Primary | ICD-10-CM

## 2024-09-16 DIAGNOSIS — E78.2 MIXED HYPERLIPIDEMIA: ICD-10-CM

## 2024-09-16 DIAGNOSIS — Z87.891 HISTORY OF TOBACCO USE: ICD-10-CM

## 2024-09-16 PROCEDURE — 99213 OFFICE O/P EST LOW 20 MIN: CPT | Performed by: INTERNAL MEDICINE

## 2024-09-16 PROCEDURE — 93000 ELECTROCARDIOGRAM COMPLETE: CPT | Performed by: INTERNAL MEDICINE

## 2024-09-16 PROCEDURE — 3075F SYST BP GE 130 - 139MM HG: CPT | Performed by: INTERNAL MEDICINE

## 2024-09-16 PROCEDURE — 3079F DIAST BP 80-89 MM HG: CPT | Performed by: INTERNAL MEDICINE

## 2024-09-16 ASSESSMENT — PATIENT HEALTH QUESTIONNAIRE - PHQ9
2. FEELING DOWN, DEPRESSED OR HOPELESS: NOT AT ALL
SUM OF ALL RESPONSES TO PHQ9 QUESTIONS 1 & 2: 0
1. LITTLE INTEREST OR PLEASURE IN DOING THINGS: NOT AT ALL
SUM OF ALL RESPONSES TO PHQ QUESTIONS 1-9: 0